# Patient Record
Sex: FEMALE | Race: BLACK OR AFRICAN AMERICAN | NOT HISPANIC OR LATINO
[De-identification: names, ages, dates, MRNs, and addresses within clinical notes are randomized per-mention and may not be internally consistent; named-entity substitution may affect disease eponyms.]

---

## 2022-03-02 ENCOUNTER — DASHBOARD ENCOUNTERS (OUTPATIENT)
Age: 57
End: 2022-03-02

## 2022-03-02 ENCOUNTER — OFFICE VISIT (OUTPATIENT)
Dept: URBAN - METROPOLITAN AREA CLINIC 78 | Facility: CLINIC | Age: 57
End: 2022-03-02
Payer: COMMERCIAL

## 2022-03-02 VITALS
HEIGHT: 69 IN | RESPIRATION RATE: 16 BRPM | BODY MASS INDEX: 29.09 KG/M2 | WEIGHT: 196.4 LBS | TEMPERATURE: 97.6 F | HEART RATE: 48 BPM | SYSTOLIC BLOOD PRESSURE: 137 MMHG | DIASTOLIC BLOOD PRESSURE: 84 MMHG

## 2022-03-02 DIAGNOSIS — K59.01 CONSTIPATION BY DELAYED COLONIC TRANSIT: ICD-10-CM

## 2022-03-02 DIAGNOSIS — K92.1 BLOOD IN STOOL: ICD-10-CM

## 2022-03-02 DIAGNOSIS — Z79.01 CURRENT USE OF LONG TERM ANTICOAGULATION: ICD-10-CM

## 2022-03-02 PROBLEM — 711150003: Status: ACTIVE | Noted: 2022-03-02

## 2022-03-02 PROCEDURE — 99204 OFFICE O/P NEW MOD 45 MIN: CPT | Performed by: INTERNAL MEDICINE

## 2022-03-02 RX ORDER — LURASIDONE HYDROCHLORIDE 120 MG/1
1 TABLET IN THE EVENING WITH FOOD TABLET, FILM COATED ORAL ONCE A DAY
Status: ACTIVE | COMMUNITY

## 2022-03-02 RX ORDER — ROSUVASTATIN CALCIUM 5 MG/1
1 TABLET TABLET, FILM COATED ORAL ONCE A DAY
Status: ACTIVE | COMMUNITY

## 2022-03-02 RX ORDER — LURASIDONE HYDROCHLORIDE 20 MG/1
1 TABLET IN THE EVENING WITH FOOD TABLET, FILM COATED ORAL ONCE A DAY
Status: ACTIVE | COMMUNITY

## 2022-03-02 RX ORDER — RIVAROXABAN 10 MG/1
1 TABLET WITH FOOD TABLET, FILM COATED ORAL ONCE A DAY
Status: ACTIVE | COMMUNITY

## 2022-03-02 NOTE — HPI-TODAY'S VISIT:
Patient was referred by Dr. Claudette Trivedi  A copy of this document will be sent to the physician.   Patient has family history of colon cancer ( father had colon cancer at age 64 ) . Patient had a colonoscopy in 2019 ( reviewed in chart , due in 5 years )   Patient is on Xarelto in her legs   Patient has hx of constipation  requiring laxative use    Senna /and stool softner and Magnesium tablet daily   jan 2015 : Poor prep  Repeat colonoscopy in 2015 by Dr Pathak still suboptimal prep   2019 inadequate prep first colonoscopy  2019 : repeat she was given clearance for 5 years  Patient lives with her brother who is accompanying her in clinic visit

## 2022-03-04 ENCOUNTER — TELEPHONE ENCOUNTER (OUTPATIENT)
Dept: URBAN - METROPOLITAN AREA CLINIC 78 | Facility: CLINIC | Age: 57
End: 2022-03-04

## 2022-03-04 RX ORDER — SODIUM SULFATE, MAGNESIUM SULFATE, AND POTASSIUM CHLORIDE 17.75; 2.7; 2.25 G/1; G/1; G/1
12 TABLETS TABLET ORAL
Qty: 24 TABLETS | Refills: 0 | OUTPATIENT
Start: 2022-03-07 | End: 2022-03-08

## 2022-04-08 ENCOUNTER — OFFICE VISIT (OUTPATIENT)
Dept: URBAN - METROPOLITAN AREA SURGERY CENTER 15 | Facility: SURGERY CENTER | Age: 57
End: 2022-04-08
Payer: COMMERCIAL

## 2022-04-08 DIAGNOSIS — K92.1 BLOOD IN STOOL: ICD-10-CM

## 2022-04-08 PROBLEM — 35298007: Status: ACTIVE | Noted: 2022-03-02

## 2022-04-08 PROCEDURE — 45378 DIAGNOSTIC COLONOSCOPY: CPT | Performed by: INTERNAL MEDICINE

## 2022-04-08 PROCEDURE — G8907 PT DOC NO EVENTS ON DISCHARG: HCPCS | Performed by: INTERNAL MEDICINE

## 2022-04-08 RX ORDER — ROSUVASTATIN CALCIUM 5 MG/1
1 TABLET TABLET, FILM COATED ORAL ONCE A DAY
Status: ACTIVE | COMMUNITY

## 2022-04-08 RX ORDER — LURASIDONE HYDROCHLORIDE 120 MG/1
1 TABLET IN THE EVENING WITH FOOD TABLET, FILM COATED ORAL ONCE A DAY
Status: ACTIVE | COMMUNITY

## 2022-04-08 RX ORDER — RIVAROXABAN 10 MG/1
1 TABLET WITH FOOD TABLET, FILM COATED ORAL ONCE A DAY
Status: ACTIVE | COMMUNITY

## 2022-04-08 RX ORDER — LURASIDONE HYDROCHLORIDE 20 MG/1
1 TABLET IN THE EVENING WITH FOOD TABLET, FILM COATED ORAL ONCE A DAY
Status: ACTIVE | COMMUNITY

## 2024-09-23 ENCOUNTER — TELEPHONE ENCOUNTER (OUTPATIENT)
Dept: URBAN - METROPOLITAN AREA CLINIC 78 | Facility: CLINIC | Age: 59
End: 2024-09-23

## 2024-10-04 ENCOUNTER — OFFICE VISIT (OUTPATIENT)
Dept: URBAN - METROPOLITAN AREA CLINIC 78 | Facility: CLINIC | Age: 59
End: 2024-10-04

## 2025-07-10 ENCOUNTER — OFFICE VISIT (OUTPATIENT)
Dept: URBAN - METROPOLITAN AREA CLINIC 35 | Facility: CLINIC | Age: 60
End: 2025-07-10
Payer: COMMERCIAL

## 2025-07-10 ENCOUNTER — LAB OUTSIDE AN ENCOUNTER (OUTPATIENT)
Dept: URBAN - METROPOLITAN AREA CLINIC 35 | Facility: CLINIC | Age: 60
End: 2025-07-10

## 2025-07-10 DIAGNOSIS — K62.5 RECTAL BLEEDING: ICD-10-CM

## 2025-07-10 DIAGNOSIS — R19.5 OCCULT BLOOD POSITIVE STOOL: ICD-10-CM

## 2025-07-10 DIAGNOSIS — K59.09 CHRONIC CONSTIPATION: ICD-10-CM

## 2025-07-10 DIAGNOSIS — Z80.0 FAMILY HX OF COLON CANCER: ICD-10-CM

## 2025-07-10 PROCEDURE — 99203 OFFICE O/P NEW LOW 30 MIN: CPT | Performed by: STUDENT IN AN ORGANIZED HEALTH CARE EDUCATION/TRAINING PROGRAM

## 2025-07-10 RX ORDER — RIVAROXABAN 10 MG/1
1 TABLET WITH FOOD TABLET, FILM COATED ORAL ONCE A DAY
Status: ON HOLD | COMMUNITY

## 2025-07-10 RX ORDER — LURASIDONE HYDROCHLORIDE 120 MG/1
1 TABLET IN THE EVENING WITH FOOD TABLET, FILM COATED ORAL ONCE A DAY
Status: ACTIVE | COMMUNITY

## 2025-07-10 RX ORDER — LURASIDONE HYDROCHLORIDE 20 MG/1
1 TABLET IN THE EVENING WITH FOOD TABLET, FILM COATED ORAL ONCE A DAY
Status: ACTIVE | COMMUNITY

## 2025-07-10 RX ORDER — SODIUM, POTASSIUM,MAG SULFATES 17.5-3.13G
ML AS DIRECTED SOLUTION, RECONSTITUTED, ORAL ORAL
Qty: 1 KIT | Refills: 0 | OUTPATIENT
Start: 2025-07-10 | End: 2025-07-12

## 2025-07-10 RX ORDER — METOCLOPRAMIDE 5 MG/1
1 TABLET TABLET ORAL DAILY
Qty: 3 TABLET | Refills: 0 | OUTPATIENT
Start: 2025-07-10

## 2025-07-10 RX ORDER — ROSUVASTATIN 5 MG/1
1 TABLET TABLET, FILM COATED ORAL ONCE A DAY
Status: ACTIVE | COMMUNITY

## 2025-07-10 NOTE — HPI-TODAY'S VISIT:
60 year old female patient presents for consultation for family hx CRC, and constipation  Patient was last seen at [facility] by Dr. Ibis Louie. Family hx CRC father (in 60s). Reports no overt blood, In past some w/ wiping, not recent. Positive stool test 11/2024 per brother. Currently taking Magnesium, colace only. She has a bowel movement every other day to 2x per week. Previoulsy has gone 6 days w/o BM. Seems somewhat improved now No abdominal pain. 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 Prior c-scopes hx poor prep.  Outside Labs: 04/10/2025 CBC: WBC 4.3 wnl, HGB 13.1 wnl, MCV 87.4 wnl, Platelets 204 wnl. CMP: Bilirubin 0.4 wnl, ALK Phos. 73 wnl, AST 19 wnl, ALT 17 wnl, Creatinine 1.09 H  Last Colon:04/08/2022 - Preparation of the colon was poor. - Stool in the rectum, in the recto-sigmoid colon and in the sigmoid colon. - No specimens collected.

## 2025-07-17 ENCOUNTER — CLAIMS CREATED FROM THE CLAIM WINDOW (OUTPATIENT)
Dept: URBAN - METROPOLITAN AREA SURGERY CENTER 8 | Facility: SURGERY CENTER | Age: 60
End: 2025-07-17
Payer: COMMERCIAL

## 2025-07-17 ENCOUNTER — CLAIMS CREATED FROM THE CLAIM WINDOW (OUTPATIENT)
Dept: URBAN - METROPOLITAN AREA CLINIC 4 | Facility: CLINIC | Age: 60
End: 2025-07-17
Payer: COMMERCIAL

## 2025-07-17 DIAGNOSIS — Z12.11 ENCOUNTER FOR SCREENING FOR MALIGNANT NEOPLASM OF COLON: ICD-10-CM

## 2025-07-17 DIAGNOSIS — Z80.0 FAMILY HX OF COLON CANCER: ICD-10-CM

## 2025-07-17 DIAGNOSIS — Z12.11 COLON CANCER SCREENING: ICD-10-CM

## 2025-07-17 DIAGNOSIS — K63.5 POLYP OF COLON: ICD-10-CM

## 2025-07-17 DIAGNOSIS — K63.5 HYPERPLASTIC COLON POLYPS: ICD-10-CM

## 2025-07-17 DIAGNOSIS — K63.5 BENIGN COLON POLYP: ICD-10-CM

## 2025-07-17 PROCEDURE — 45385 COLONOSCOPY W/LESION REMOVAL: CPT | Performed by: STUDENT IN AN ORGANIZED HEALTH CARE EDUCATION/TRAINING PROGRAM

## 2025-07-17 PROCEDURE — 00811 ANES LWR INTST NDSC NOS: CPT | Performed by: NURSE ANESTHETIST, CERTIFIED REGISTERED

## 2025-07-17 PROCEDURE — 88305 TISSUE EXAM BY PATHOLOGIST: CPT | Performed by: PATHOLOGY

## 2025-07-17 RX ORDER — LURASIDONE HYDROCHLORIDE 20 MG/1
1 TABLET IN THE EVENING WITH FOOD TABLET, FILM COATED ORAL ONCE A DAY
Status: ACTIVE | COMMUNITY

## 2025-07-17 RX ORDER — ROSUVASTATIN 5 MG/1
1 TABLET TABLET, FILM COATED ORAL ONCE A DAY
Status: ACTIVE | COMMUNITY

## 2025-07-17 RX ORDER — METOCLOPRAMIDE 5 MG/1
1 TABLET TABLET ORAL DAILY
Qty: 3 TABLET | Refills: 0 | Status: ACTIVE | COMMUNITY
Start: 2025-07-10

## 2025-07-17 RX ORDER — RIVAROXABAN 10 MG/1
1 TABLET WITH FOOD TABLET, FILM COATED ORAL ONCE A DAY
Status: ON HOLD | COMMUNITY

## 2025-07-17 RX ORDER — LURASIDONE HYDROCHLORIDE 120 MG/1
1 TABLET IN THE EVENING WITH FOOD TABLET, FILM COATED ORAL ONCE A DAY
Status: ACTIVE | COMMUNITY

## 2025-07-30 ENCOUNTER — OFFICE VISIT (OUTPATIENT)
Dept: URBAN - METROPOLITAN AREA CLINIC 35 | Facility: CLINIC | Age: 60
End: 2025-07-30

## 2025-07-30 RX ORDER — METOCLOPRAMIDE 5 MG/1
1 TABLET TABLET ORAL DAILY
Qty: 3 TABLET | Refills: 0 | COMMUNITY
Start: 2025-07-10

## 2025-07-30 RX ORDER — ROSUVASTATIN 5 MG/1
1 TABLET TABLET, FILM COATED ORAL ONCE A DAY
COMMUNITY

## 2025-07-30 RX ORDER — LURASIDONE HYDROCHLORIDE 120 MG/1
1 TABLET IN THE EVENING WITH FOOD TABLET, FILM COATED ORAL ONCE A DAY
COMMUNITY

## 2025-07-30 RX ORDER — RIVAROXABAN 10 MG/1
1 TABLET WITH FOOD TABLET, FILM COATED ORAL ONCE A DAY
COMMUNITY

## 2025-07-30 RX ORDER — LURASIDONE HYDROCHLORIDE 20 MG/1
1 TABLET IN THE EVENING WITH FOOD TABLET, FILM COATED ORAL ONCE A DAY
COMMUNITY

## 2025-07-30 NOTE — HPI-TODAY'S VISIT:
60 year old female patient presents for followup for colonoscopy with a hx of constipation, rectal bleeding and family hx CRC.  Colon Report: 07/17/2025 Impression: - One 4 mm polyp in the sigmoid colon, removed with a cold snare. Resected and retrieved. - Internal hemorrhoids. Path Report Shows:  Colon, Sigmoid, Polypectomy (Cold Snare): HYPERPLASTIC POLYP(S).  Last Visit(07/10/2025) 60 year old female patient presents for consultation for family hx CRC, and constipation  Patient was last seen at [facility] by Dr. Ibis Louie. Family hx CRC father (in 60s). Reports no overt blood, In past some w/ wiping, not recent. Positive stool test 11/2024 per brother. Currently taking Magnesium, colace only. She has a bowel movement every other day to 2x per week. Previoulsy has gone 6 days w/o BM. Seems somewhat improved now No abdominal pain. 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 Prior c-scopes hx poor prep.  Outside Labs: 04/10/2025 CBC: WBC 4.3 wnl, HGB 13.1 wnl, MCV 87.4 wnl, Platelets 204 wnl. CMP: Bilirubin 0.4 wnl, ALK Phos. 73 wnl, AST 19 wnl, ALT 17 wnl, Creatinine 1.09 H  Last Colon:04/08/2022 - Preparation of the colon was poor. - Stool in the rectum, in the recto-sigmoid colon and in the sigmoid colon. - No specimens collected.